# Patient Record
Sex: FEMALE | Race: OTHER | HISPANIC OR LATINO
[De-identification: names, ages, dates, MRNs, and addresses within clinical notes are randomized per-mention and may not be internally consistent; named-entity substitution may affect disease eponyms.]

---

## 2019-04-10 PROBLEM — Z00.00 ENCOUNTER FOR PREVENTIVE HEALTH EXAMINATION: Status: ACTIVE | Noted: 2019-04-10

## 2019-04-25 ENCOUNTER — APPOINTMENT (OUTPATIENT)
Dept: SURGERY | Facility: CLINIC | Age: 24
End: 2019-04-25
Payer: COMMERCIAL

## 2019-04-25 VITALS
WEIGHT: 187 LBS | HEART RATE: 87 BPM | OXYGEN SATURATION: 98 % | DIASTOLIC BLOOD PRESSURE: 65 MMHG | HEIGHT: 64 IN | BODY MASS INDEX: 31.92 KG/M2 | SYSTOLIC BLOOD PRESSURE: 115 MMHG

## 2019-04-25 DIAGNOSIS — Z80.3 FAMILY HISTORY OF MALIGNANT NEOPLASM OF BREAST: ICD-10-CM

## 2019-04-25 DIAGNOSIS — N63.20 UNSPECIFIED LUMP IN THE LEFT BREAST, UNSPECIFIED QUADRANT: ICD-10-CM

## 2019-04-25 DIAGNOSIS — R22.31 LOCALIZED SWELLING, MASS AND LUMP, RIGHT UPPER LIMB: ICD-10-CM

## 2019-04-25 PROCEDURE — 99243 OFF/OP CNSLTJ NEW/EST LOW 30: CPT

## 2019-04-25 NOTE — ASSESSMENT
[FreeTextEntry1] : After examination patient was explained about complex cyst and the reports from patient medical. Prescription for ultrasound guided biopsy of the right axillary/breast cyst was given to the patient. Further treatment will be based on the ultrasound findings are biopsy. Followup in the office as needed.

## 2019-04-25 NOTE — CONSULT LETTER
[Dear  ___] : Dear  [unfilled], [Consult Letter:] : I had the pleasure of evaluating your patient, [unfilled]. [Please see my note below.] : Please see my note below. [Consult Closing:] : Thank you very much for allowing me to participate in the care of this patient.  If you have any questions, please do not hesitate to contact me. [Sincerely,] : Sincerely, [FreeTextEntry3] : Kade Cobos MD, FACS\par [facility]\[place] [address]\par  [DrSanjuanita  ___] : Dr. CALIXTO

## 2019-04-25 NOTE — REVIEW OF SYSTEMS
[Fever] : no fever [Chills] : no chills [Chest Pain] : no chest pain [Shortness Of Breath] : no shortness of breath [Abdominal Pain] : no abdominal pain [Vomiting] : no vomiting [Constipation] : no constipation [Diarrhea] : no diarrhea [FreeTextEntry7] : occasionally , nausea

## 2019-04-25 NOTE — PAST MEDICAL HISTORY
[Menstruating] : The patient is menstruating [Menarche Age ____] : age at menarche was [unfilled] [Approximately ___] : the LMP was approximately [unfilled] [Regular Cycle Intervals] : have been regular [Total Preg ___] : G[unfilled] [History of Hormone Replacement Treatment] : has no history of hormone replacement treatment [FreeTextEntry6] : none  [FreeTextEntry7] : yes  [FreeTextEntry8] : none

## 2019-04-25 NOTE — DATA REVIEWED
[FreeTextEntry1] : Patient has reports from [facility] for the ultrasound of the bone both breasts on March 25, 2019. The final report shows a right sided suspicious mass in the right axillary region a 9 mm complex cyst versus hypothermal mass for which ultrasound guided biopsy was recommended. In the left breast closer to the medial aspect patient has a dermal cyst.

## 2019-04-25 NOTE — REASON FOR VISIT
[Consultation] : a consultation visit [FreeTextEntry1] : Ms Jack was referred here today for mass right axilla and left breast . Her Maternal aunt was diagnosed with breast cancer

## 2019-04-25 NOTE — HISTORY OF PRESENT ILLNESS
[FreeTextEntry1] : Patient presents to the office with a history of right axillary breast mass that she is feeling for about a year. She recently had a sonogram done which showed a complex cyst in that area. Ultrasound-guided biopsy was suggested. Patient is here to discuss her options regarding the right breast cyst.

## 2019-09-09 ENCOUNTER — MESSAGE (OUTPATIENT)
Age: 24
End: 2019-09-09

## 2023-01-19 ENCOUNTER — EMERGENCY (EMERGENCY)
Facility: HOSPITAL | Age: 28
LOS: 0 days | Discharge: HOME | End: 2023-01-19
Attending: EMERGENCY MEDICINE | Admitting: EMERGENCY MEDICINE
Payer: COMMERCIAL

## 2023-01-19 VITALS
TEMPERATURE: 99 F | HEART RATE: 101 BPM | DIASTOLIC BLOOD PRESSURE: 71 MMHG | SYSTOLIC BLOOD PRESSURE: 121 MMHG | OXYGEN SATURATION: 99 % | WEIGHT: 199.96 LBS | RESPIRATION RATE: 18 BRPM

## 2023-01-19 VITALS — HEART RATE: 88 BPM

## 2023-01-19 DIAGNOSIS — S09.90XA UNSPECIFIED INJURY OF HEAD, INITIAL ENCOUNTER: ICD-10-CM

## 2023-01-19 DIAGNOSIS — R11.0 NAUSEA: ICD-10-CM
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
## 2023-01-19 PROCEDURE — 99283 EMERGENCY DEPT VISIT LOW MDM: CPT

## 2023-01-19 RX ORDER — ACETAMINOPHEN 500 MG
975 TABLET ORAL ONCE
Refills: 0 | Status: COMPLETED | OUTPATIENT
Start: 2023-01-19 | End: 2023-01-19

## 2023-01-19 RX ADMIN — Medication 975 MILLIGRAM(S): at 23:09

## 2023-01-19 NOTE — ED PROVIDER NOTE - ATTENDING APP SHARED VISIT CONTRIBUTION OF CARE
27-year-old female with no significant past medical history presents with about a week posterior head pain after she was on a cruise and slipped on water hitting the back of her head on the deck.  No LOC, not on anticoagulation.  Patient reports that she has been having mild throbbing pain to the back of her head, mild, nonradiating, at times associated with mild nausea.  Patient reports she works for [employer] so does steroid to screen for long periods of times and notices her symptoms are worse when she does this.  Has not taken anything for symptoms.  Recalls all events. No fever, chills, v, cp,  pleuritic cp, sob, palpitations, diaphoresis, cough, chest wall/rib pain, clavicular pain, ankle pain, knee pain, shoulder pain, wrist pain, no back pain, no hip pain, no ha/lh/dizziness, numbness/tingling, neck pain/ stiffness, abd pain,  melena/brbpr, urinary symptoms, edema, calf pain/swelling/erythema, sick contacts, recent travel . GCS 15.    On Exam:  Vital Signs: I have reviewed the initial vital signs.  Constitutional: Non toxic appearing pt sitting on stretcher in nad.   HEAD: No signs of basilar skull fracture.  Integumentary: No rash. No laceration, abrasions, ecchymoses or swelling.   EYES: No periorbital swelling/ecchymoses. PERRL, EOM intact. No nystagmus. No subconjunctival hemorrhage.   ENT: MMM. No rhinorrhea/otorrhea. No epistaxis,  No septal hematoma. No mastoid ecchymoses. No intraoral bleeding, No loose or craked teeth, no active bleeding. No malocclusion. No TMJ pain.  NECK: Supple, non-tender, No palpable shelves or step-offs.  BACK: No spinous tenderness. No palpable shelves or step-offs.  Cardiovascular: RRR, radial pulses 2/4 b/l. dp and pt pulses 2/4/ b/l. No pain to palpation to chest wall.  Respiratory: BS present b/l, ctabl, no wheezing or crackles, no stridor. No pain to palpation to ribs b/l. No crepitus. No subq emphysema.   Gastrointestinal: BS present throughout all 4 quadrants, soft, nd, nt. no r/g.  Musculoskeletal: FROM of all extremities. No bony tenderness. No pain to palpation to clavicles, no obvious bony deformities. No hip pain. No short leg. No internal or external rotation of LE  Neurologic: GCS 15. CN II-XII intact, no facial droop or slurring of speech. Motor 5/5 and sensation intact throughout upper and lower extremities. (-) Pronator. (-) Romberg. NIHSS 0.    Plan: Pain control, reassess.

## 2023-01-19 NOTE — ED PROVIDER NOTE - NSFOLLOWUPCLINICS_GEN_ALL_ED_FT
[facility] Concussion Program  Concussion Program  [address] [place] [address]   Phone: [phone]  Fax:   Follow Up Time: 1-3 Days

## 2023-01-19 NOTE — ED PROVIDER NOTE - PATIENT PORTAL LINK FT
You can access the FollowMyHealth Patient Portal offered by [facility] by registering at the following website: [email or web address]. By joining [employer]’s FollowMyHealth portal, you will also be able to view your health information using other applications (apps) compatible with our system.

## 2023-01-19 NOTE — ED PROVIDER NOTE - PROGRESS NOTE DETAILS
Pt gcs 15, ambulatory in ed. Supportive care and home care discussed in detail. Patient aware they may have to return for re-evaluation and possible admission if outpatient treatment fails. Strict return precautions discussed.

## 2023-01-19 NOTE — ED PROVIDER NOTE - NSFOLLOWUPINSTRUCTIONS_ED_ALL_ED_FT
Our Emergency Department Referral Coordinators will be reaching out to you in the next 24-48 hours from 9:00am to 5:00pm with a follow up appointment. Please expect a phone call from the hospital in that time frame. If you do not receive a call or if you have any questions or concerns, you can reach them at [phone] or [phone].     Closed Head Injury    Closed head injury in an injury to your head that may or may not involve a traumatic brain injury (TBI). Symptoms of TBI can be short or long lasting and include headache, dizziness, interference with memory or speech, fatigue, confusion, changes in sleep, mood changes, nausea, depression/anxiety, and dulling of senses. Make sure to obtain proper rest which includes getting plenty of sleep, avoiding excessive visual stimulation, and avoiding activities that may cause physical or mental stress. Avoid any situation where there is potential for another head injury including sports.    SEEK MEDICAL CARE IF YOU HAVE THE FOLLOWING SYMPTOMS: unusual drowsiness, vomiting, severe dizziness, seizures, lightheadedness, muscular weakness, different pupil sizes, visual changes, or clear or bloody discharge from your ears or nose.

## 2023-01-19 NOTE — ED PROVIDER NOTE - OBJECTIVE STATEMENT
27-year-old female with no past medical history presented for evaluation of head injury that occurred on Friday 6 days ago.  Patient states that she was on a cruise and fell backwards onto the deck hitting her head.  No LOC.  No blood thinners.  Patient reports having a mild headache every day.  No dizziness or lightheadedness.  No visual changes.  No nausea or vomiting. No alleviating factors.  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 27-year-old female with no past medical history presented for evaluation of head injury that occurred on Friday 6 days ago.  Patient states that she was on a cruise and fell backwards onto the deck hitting her head.  No LOC.  No blood thinners.  Patient reports having a mild headache intermittently. No dizziness or lightheadedness.  No visual changes.  No nausea or vomiting. No alleviating factors.  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

## 2023-01-27 ENCOUNTER — APPOINTMENT (OUTPATIENT)
Dept: NEUROPSYCHOLOGY | Facility: CLINIC | Age: 28
End: 2023-01-27

## 2023-01-27 ENCOUNTER — OUTPATIENT (OUTPATIENT)
Dept: OUTPATIENT SERVICES | Facility: HOSPITAL | Age: 28
LOS: 1 days | End: 2023-01-27

## 2023-01-27 DIAGNOSIS — S06.0X0A CONCUSSION WITHOUT LOSS OF CONSCIOUSNESS, INITIAL ENCOUNTER: ICD-10-CM

## 2023-01-27 NOTE — REASON FOR VISIT
[Consultation for neuropsychological evaluation] : Consultation for neuropsychological evaluation [Patient] : Patient [FreeTextEntry1] : concussion clinic

## 2023-01-27 NOTE — DISCUSSION/SUMMARY
[FreeTextEntry8] : CONCUSSION TRIAGE \par \par Reason for Visit- the patient was seen for an initial evaluation to determine cognitive and psychological status following a head injury. This evaluation was conducted via telehealth. \par \par  \par \par 1. Description of the Injury \par \par DOI—1/12/2023 \par \par Mechanism—slipped on the deck of a cruise and hit the back of her head \par \par Location of Impact—occipital lobe  \par \par LOC—denied  \par \par Events after the accident that the patient does not remember— some things not remembered (unclear) \par \par Events before the accident that the patient does not remember-- some things not remembered (unclear) \par \par  \par \par 2. Treatment received:? \par \par Emergency Room—6 days later \par \par Admitted overnight—no \par \par Primary Physician—went to her PCP after being seen by the ED \par \par Medication for Dizziness—denied \par \par Medication for Nausea--denied \par \par Medication for Pain--denied \par \par CT Scan-- denied \par \par Physical Injuries—yes neck hurt for the next two days, full neck \par \par Symptoms Present right after the injury included—Dazed, Confused about events, Headaches/ pressure, Noise sensitivity \par \par  \par \par 3. Relevant Medical History \par \par History of previous concussion/s--denied \par \par History of blacking out--denied \par \par Significant Medical History—Anxiety (current- managed through therapy), Depression?historical \par \par ? \par \par 4. Post-Concussion Symptoms Currently Present \par \par Headaches, Nausea, Drowsiness? \par \par Sleeping more than usual? \par \par Sensitivity to light? \par \par Irritability? \par \par Mental “fogginess”?? \par \par Word finding difficulties \par \par Fatigue? \par \par Becoming confused with directions or tasks? \par \par Answering questions or thinking more slowly? \par \par ? \par \par 5. Functional Impact  \par \par What symptoms are most concerning/impacting--light sensitivity and headaches, the tiredness as well  \par \par ? \par \par On a scale of 0 (no difference/normal) to 6 (major difference/very different) to what degree to do feel “different” than before the injury?-- 2/3 \par \par ? \par \par How has the injury impacted your daily life (e.g. 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 Made being on the computer more difficult and increased headaches. In general feels like she is slower to complete things and in her thinking skills.  \par \par  \par \par Have you missed days of work/school-- no  \par \par ? \par \par ?6. Objective Information \par \par The patient presented with a euthymic mood. Speech patterns were within normal limits.  \par \par  \par \par 7. Procedures  \par \par A focused clinical interview was conducted, with an emphasis on current symptoms and their functional impact.   \par \par  \par \par 8. Findings/Interventions \par \par The patient continues to experience multiple symptoms, as reported above.  \par \par  \par \par 9. Psychoeducation  \par \par Psychoeducation regarding head injuries was provided, and recommendations for recovery were reviewed. In particular, the patient was encouraged to  \par \par to watch out for the red flags,  \par \par take frequent rest breaks,  \par \par make sure to have a balanced sleep schedule (given sleep hygiene pamphlet),  \par \par eat plenty of carbohydrates and protein,  \par \par drink lots of fluids,  \par \par wear sunglasses or blue light glasses,  \par \par wear ear plugs or noise cancelling headphones,  \par \par slowly increase cognitive and physical activity as symptoms permit,  \par \par do not return to work until after a cognitive evaluation, \par \par follow up with PCP \par \par  \par \par 10. Plan/Follow-up  \par \par Based on symptoms and functional impact, the patient will undergo neurocognitive testing with this office to further elucidate the neuropsychological sequelae of their injury.

## 2023-02-03 ENCOUNTER — APPOINTMENT (OUTPATIENT)
Dept: NEUROPSYCHOLOGY | Facility: CLINIC | Age: 28
End: 2023-02-03

## 2023-10-03 ENCOUNTER — EMERGENCY (EMERGENCY)
Facility: HOSPITAL | Age: 28
LOS: 0 days | Discharge: HOME | End: 2023-10-04
Attending: EMERGENCY MEDICINE
Payer: COMMERCIAL

## 2023-10-03 VITALS
DIASTOLIC BLOOD PRESSURE: 70 MMHG | RESPIRATION RATE: 15 BRPM | OXYGEN SATURATION: 98 % | TEMPERATURE: 98 F | HEART RATE: 125 BPM | WEIGHT: 199.96 LBS | HEIGHT: 64 IN | SYSTOLIC BLOOD PRESSURE: 121 MMHG

## 2023-10-03 DIAGNOSIS — L02.411 CUTANEOUS ABSCESS OF RIGHT AXILLA: ICD-10-CM

## 2023-10-03 PROCEDURE — 99283 EMERGENCY DEPT VISIT LOW MDM: CPT | Mod: 25

## 2023-10-03 PROCEDURE — 10060 I&D ABSCESS SIMPLE/SINGLE: CPT

## 2023-10-03 PROCEDURE — 10061 I&D ABSCESS COMP/MULTIPLE: CPT

## 2023-10-03 NOTE — ED ADULT NURSE NOTE - NSFALLUNIVINTERV_ED_ALL_ED
Bed/Stretcher in lowest position, wheels locked, appropriate side rails in place/Call bell, personal items and telephone in reach/Instruct patient to call for assistance before getting out of bed/chair/stretcher/Non-slip footwear applied when patient is off stretcher/[place] to call system/Physically safe environment - no spills, clutter or unnecessary equipment/Purposeful proactive rounding/Room/bathroom lighting operational, light cord in reach

## 2023-10-04 VITALS
SYSTOLIC BLOOD PRESSURE: 105 MMHG | OXYGEN SATURATION: 100 % | TEMPERATURE: 98 F | RESPIRATION RATE: 17 BRPM | HEART RATE: 85 BPM | DIASTOLIC BLOOD PRESSURE: 61 MMHG

## 2023-10-04 NOTE — ED PROVIDER NOTE - OBJECTIVE STATEMENT
28-year-old female denies significant past medical history presents with complaint of abscess.  She reports she has had recurrent abscess to the right axilla, states that it comes and goes.  Reports over the past 3 days she has noted increased swelling to the area of the right axilla with tenderness and mild erythema.  Denies fever/chills, recent illness, purulent discharge, unintentional weight loss, numbness/tingling.

## 2023-10-04 NOTE — ED PROVIDER NOTE - CLINICAL SUMMARY MEDICAL DECISION MAKING FREE TEXT BOX
29 yo F, no medical hx but has had previous axillary abscess, here for painful swelling to R axilla x 3 days, progressively worsening, no drainage. No recent trauma, illness.    VS notable for tachycardia -- patient anxious and in pain, HR improved after drainage.    On exam is well appearing, in no distress, has clear lungs, RRR, soft, NT, ND abdomen. Has tender, fluctuant area to lower aspect of R axilla with surrounding induration.     US demonstrates collection consistent with abscess which was aspirated as noted.    Given this was aspirated not opened, has overlying redness, warmth, will treat with abx.   Will need surgical follow up as she has recurrent abscesses in same area and may benefit from additional treatment to prevent recurrent infections.    Will dc home with warm compresses, sx monitoring, return precautions, follow up.,

## 2023-10-04 NOTE — ED PROVIDER NOTE - PATIENT PORTAL LINK FT
You can access the FollowMyHealth Patient Portal offered by [facility] by registering at the following website: [email or web address]. By joining [employer]’s FollowMyHealth portal, you will also be able to view your health information using other applications (apps) compatible with our system.

## 2023-10-04 NOTE — ED PROVIDER NOTE - NSFOLLOWUPINSTRUCTIONS_ED_ALL_ED_FT
Our Emergency Department Referral Coordinators will be reaching out to you in the next 24-48 hours from 9:00am to 5:00pm with a follow up appointment. Please expect a phone call from the hospital in that time frame. If you do not receive a call or if you have any questions or concerns, you can reach them at   [phone].    Antibiotics were sent to your pharmacy. Take as prescribed.    Abscess    An abscess is an infected area that contains a collection of pus and debris. It can occur in almost any part of the body and occurs when the tissue gets infection. Symptoms include a painful mass that is red, warm, tender that might break open and HAVE drainage. If your health care provider gave you antibiotics make sure to take the full course and do not stop even if feeling better.     SEEK IMMEDIATE MEDICAL CARE IF YOU HAVE ANY OF THE FOLLOWING SYMPTOMS: chills, fever, muscle aches, or red streaking from the area.

## 2023-10-09 ENCOUNTER — APPOINTMENT (OUTPATIENT)
Dept: SURGERY | Facility: CLINIC | Age: 28
End: 2023-10-09
Payer: COMMERCIAL

## 2023-10-09 VITALS
DIASTOLIC BLOOD PRESSURE: 88 MMHG | HEIGHT: 64 IN | SYSTOLIC BLOOD PRESSURE: 120 MMHG | TEMPERATURE: 97.5 F | BODY MASS INDEX: 34.15 KG/M2 | WEIGHT: 200 LBS

## 2023-10-09 PROCEDURE — 99203 OFFICE O/P NEW LOW 30 MIN: CPT

## 2023-11-06 ENCOUNTER — APPOINTMENT (OUTPATIENT)
Dept: SURGERY | Facility: CLINIC | Age: 28
End: 2023-11-06

## 2023-12-07 NOTE — ED PROCEDURE NOTE - CPROC ED PATIENT POSITION1
Issac Ag MD  Performed by: Issac Ag MD  Authorized by: Issac Ag 
sitting

## 2024-03-21 ENCOUNTER — APPOINTMENT (OUTPATIENT)
Dept: ENDOCRINOLOGY | Facility: CLINIC | Age: 29
End: 2024-03-21

## 2024-07-24 NOTE — PHYSICAL EXAM
[Normocephalic] : normocephalic [Atraumatic] : atraumatic [Supple] : supple [No Supraclavicular Adenopathy] : no supraclavicular adenopathy [Examined in the supine and seated position] : examined in the supine and seated position [Symmetrical] : symmetrical [No dominant masses] : no dominant masses in right breast  [No dominant masses] : no dominant masses left breast [Nl] : Neurological [No Nipple Retraction] : no left nipple retraction [No Nipple Discharge] : no left nipple discharge [Palpitations] : palpitations [Breast Mass Right Breast ___cm] : no masses [Breast Mass Left Breast ___cm] : no masses [Breast Nipple Flattening] : nipples not flattened [Breast Nipple Inversion] : nipples not inverted [Breast Nipple Retraction] : nipples not retracted [Breast Nipple Fissures] : nipples not fissured [Breast Abnormal Lactation (Galactorrhea)] : no galactorrhea [Breast Abnormal Secretion Bloody Fluid] : no bloody discharge [Breast Abnormal Secretion Serous Fluid] : no serous discharge [Breast Abnormal Secretion Opalescent Fluid] : no milky discharge [No Axillary Lymphadenopathy] : no left axillary lymphadenopathy

## 2025-07-14 ENCOUNTER — NON-APPOINTMENT (OUTPATIENT)
Age: 30
End: 2025-07-14